# Patient Record
Sex: MALE | Race: WHITE | Employment: FULL TIME | URBAN - METROPOLITAN AREA
[De-identification: names, ages, dates, MRNs, and addresses within clinical notes are randomized per-mention and may not be internally consistent; named-entity substitution may affect disease eponyms.]

---

## 2022-07-13 ENCOUNTER — APPOINTMENT (OUTPATIENT)
Dept: GENERAL RADIOLOGY | Age: 32
End: 2022-07-13
Payer: COMMERCIAL

## 2022-07-13 ENCOUNTER — HOSPITAL ENCOUNTER (EMERGENCY)
Age: 32
Discharge: HOME | End: 2022-07-13
Attending: EMERGENCY MEDICINE
Payer: COMMERCIAL

## 2022-07-13 VITALS
SYSTOLIC BLOOD PRESSURE: 142 MMHG | OXYGEN SATURATION: 97 % | BODY MASS INDEX: 27.83 KG/M2 | DIASTOLIC BLOOD PRESSURE: 87 MMHG | RESPIRATION RATE: 18 BRPM | TEMPERATURE: 98.4 F | HEIGHT: 73 IN | WEIGHT: 210 LBS | HEART RATE: 86 BPM

## 2022-07-13 DIAGNOSIS — S61.230A INFECTED PUNCTURE WOUND OF RIGHT INDEX FINGER, INITIAL ENCOUNTER: ICD-10-CM

## 2022-07-13 DIAGNOSIS — L08.9 INFECTED PUNCTURE WOUND OF RIGHT INDEX FINGER, INITIAL ENCOUNTER: ICD-10-CM

## 2022-07-13 DIAGNOSIS — S61.239A PUNCTURE WOUND OF FINGER, INITIAL ENCOUNTER: Primary | ICD-10-CM

## 2022-07-13 PROCEDURE — 73140 X-RAY EXAM OF FINGER(S): CPT

## 2022-07-13 PROCEDURE — 90715 TDAP VACCINE 7 YRS/> IM: CPT | Performed by: PHYSICIAN ASSISTANT

## 2022-07-13 PROCEDURE — 99284 EMERGENCY DEPT VISIT MOD MDM: CPT

## 2022-07-13 PROCEDURE — 6360000002 HC RX W HCPCS: Performed by: PHYSICIAN ASSISTANT

## 2022-07-13 PROCEDURE — 90471 IMMUNIZATION ADMIN: CPT | Performed by: PHYSICIAN ASSISTANT

## 2022-07-13 PROCEDURE — 29130 APPL FINGER SPLINT STATIC: CPT

## 2022-07-13 PROCEDURE — 6370000000 HC RX 637 (ALT 250 FOR IP): Performed by: PHYSICIAN ASSISTANT

## 2022-07-13 RX ORDER — GINSENG 100 MG
CAPSULE ORAL ONCE
Status: COMPLETED | OUTPATIENT
Start: 2022-07-13 | End: 2022-07-13

## 2022-07-13 RX ORDER — CLINDAMYCIN HYDROCHLORIDE 150 MG/1
450 CAPSULE ORAL ONCE
Status: COMPLETED | OUTPATIENT
Start: 2022-07-13 | End: 2022-07-13

## 2022-07-13 RX ORDER — CLINDAMYCIN HYDROCHLORIDE 150 MG/1
450 CAPSULE ORAL 3 TIMES DAILY
Qty: 63 CAPSULE | Refills: 0 | Status: SHIPPED | OUTPATIENT
Start: 2022-07-13 | End: 2022-07-20

## 2022-07-13 RX ADMIN — BACITRACIN: 500 OINTMENT TOPICAL at 17:40

## 2022-07-13 RX ADMIN — CLINDAMYCIN HYDROCHLORIDE 450 MG: 150 CAPSULE ORAL at 17:17

## 2022-07-13 RX ADMIN — BACITRACIN: 500 OINTMENT TOPICAL at 18:24

## 2022-07-13 RX ADMIN — TETANUS TOXOID, REDUCED DIPHTHERIA TOXOID AND ACELLULAR PERTUSSIS VACCINE, ADSORBED 0.5 ML: 5; 2.5; 8; 8; 2.5 SUSPENSION INTRAMUSCULAR at 17:04

## 2022-07-13 ASSESSMENT — PAIN - FUNCTIONAL ASSESSMENT: PAIN_FUNCTIONAL_ASSESSMENT: 0-10

## 2022-07-13 ASSESSMENT — PAIN DESCRIPTION - LOCATION: LOCATION: FINGER (COMMENT WHICH ONE)

## 2022-07-13 ASSESSMENT — PAIN SCALES - GENERAL: PAINLEVEL_OUTOF10: 10

## 2022-07-13 ASSESSMENT — PAIN DESCRIPTION - ORIENTATION: ORIENTATION: RIGHT

## 2022-07-13 ASSESSMENT — PAIN DESCRIPTION - PAIN TYPE: TYPE: ACUTE PAIN

## 2022-07-13 NOTE — ED PROVIDER NOTES
[address] [facility] ED  [address] [facility] [address]  Phone: [phone]  Fax: [phone]      eMERGENCY dEPARTMENT eNCOUnter      Pt Name: Chio Robbins  MRN: [id number]  [place] 1990  Date of evaluation: 7/13/22      CHIEF COMPLAINT:  Chief Complaint   Patient presents with    Hand Injury       HISTORY OF PRESENT ILLNESS    Chio Robbins is a 34 y.o. male who presents with evaluation for orthopedic pain:    Location/Symptom:  Right short finger FB  Timing/Onset:  JPTA  Context/Setting:    Pt here with retained large nail in his right short finger. 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 No paresthesias/focal weakness. Tetanus not UTD. Quality:   Achy, sharp  Duration:   constant  Modifying Factors: Worse with movement and weightbearing, better with rest  Severity:   Mild-moderate    Nursing Notes were reviewed. REVIEW OF SYSTEMS       Constitutional: Denies recent fever, chills. Eyes: No vision changes. Neck: No neck pain. Respiratory: Denies recent shortness of breath. Cardiac:  Denies recent chest pain. GI:  Denies abdominal pain/nausea/vomiting/diarrhea. : Denies dysuria. Musculoskeletal:   Per HPI  Neurologic:  No headache. No focal weakness. No paresthesias. Skin:  Denies any rash. Negative in 10 essential Systems except as mentioned above and in the HPI. PAST MEDICAL HISTORY   PMH:  has a past medical history of Collar bone fracture. Surgical History:  has a past surgical history that includes fracture surgery and Tonsillectomy. Social History:  reports that he has been smoking. He does not have any smokeless tobacco history on file. He reports current alcohol use. He reports that he does not use drugs. Family History: None  Psychiatric History: None    Allergies:is allergic to pcn [penicillins].       PHYSICAL EXAM     INITIAL VITALS: BP (!) 142/87   Pulse 86   Temp 98.4 °F (36.9 °C) (Oral)   Resp 18   Ht 6' 1\" (1.854 m)   Wt 95.3 kg (210 lb)   SpO2 97%   BMI 27.71 kg/m²     Constitutional:  Well developed   Eyes:  Pupils equal/round  HENT:  Atraumatic, external ears normal, nose normal  Respiratory:  Comfortable speech and breathing  Musculoskeletal:  Right short finger with retained large nail with end of nail protruding from lateral aspect of distal mid-phalanx/DIP joint area. No nail involvement. No significant edema. No ischemic changes. Remainder of short finger and right hand all wnl. NV intact distally. No signs of acute limb ischemia. Integument:   No rash. Neurologic:  Alert & appropriate mentation/interaction, no focal deficits noted     DIAGNOSTIC RESULTS     EKG: All EKG's are interpreted by the Emergency Department Physician who either signs or Co-signs this chart in the absence of a cardiologist.  Not indicated    RADIOLOGY:   Reviewed the radiologist:  XR FINGER RIGHT (MIN 2 VIEWS)   Final Result      XR FINGER RIGHT (MIN 2 VIEWS)   Final Result   Metallic nail/foreign body in the 5th digit adjacent to the DIP joint. LABS:  Labs Reviewed - No data to display  Not indicated    EMERGENCY DEPARTMENT COURSE / MDM:     1  XR/digital block/Boostrix and oral abx to be ordered. Retained nail of right short finger distally. No ischemic changes. 1740  Procedure note:   Using Betadine dine wash I completed a full digital block using 3 mL of Marcaine with epinephrine 0.5%. Also providing some additional anesthesia locally at the site of the penetration of the nail through the dorsal aspect of the right finger. There is no nail involvement. With single attempt was able to left finger off of the nail using traction/countertraction. Wound was cleaned and irrigated with saline. Patient had full active extension and flexion. ain management satisfactory. No significant active bleeding. 1818  Reviewed XR with attending, no acute fracture or DIP involvement.   Doxy for home as PCN allergy as a child he confirms. Unable to confirm level of allergy. Splint provided and directed [facility] per his work protocol. Also provided Hand Surgeon f/u to use as needed. I have reviewed the disposition diagnosis with the patient and or their family/guardian. I have answered their questions and given discharge instructions. They voiced understanding of these instructions and did not have any further questions or complaints. Orders Placed This Encounter   Medications    bacitracin ointment    Tetanus-Diphth-Acell Pertussis (BOOSTRIX) injection 0.5 mL    clindamycin (CLEOCIN) capsule 450 mg     Order Specific Question:   Antimicrobial Indications     Answer: Other     Order Specific Question:   Other Abx Indication     Answer:   finger FB    bacitracin ointment    clindamycin (CLEOCIN) 150 MG capsule     Sig: Take 3 capsules by mouth 3 times daily for 7 days     Dispense:  63 capsule     Refill:  0       CONSULTS:  None      FINAL IMPRESSION      1. Puncture wound of finger, initial encounter    2. Infected puncture wound of right index finger, initial encounter          DISPOSITION/PLAN:  DISPOSITION          PATIENT REFERRED TO:  Nancy Amezcua MD  [address] [address]  [phone]    Schedule an appointment as soon as possible for a visit in 2 days  for re-evaluation of your injury    [facility] ED  [address] [place] [address]  [phone]  Go to   for worsening of symptoms      DISCHARGE MEDICATIONS:  Discharge Medication List as of 2022  6:27 PM      START taking these medications    Details   clindamycin (CLEOCIN) 150 MG capsule Take 3 capsules by mouth 3 times daily for 7 days, Disp-63 capsule, R-0Normal             (Please note that portions of this note were completed with a voice recognition program.  Efforts were made to edit the dictations but occasionally words are mis-transcribed.)    PATRICK Lott PA-C  07/13/22 2023

## 2022-07-13 NOTE — ED PROVIDER NOTES
[address] [facility] ED    [address] [address] [facility] [address]  Phone: [phone]  Fax: [phone]  Emergency Department  Faculty Attestation    I performed a history and physical examination of the patient and discussed management with the mid level provideer. I reviewed the mid level provider's note and agree with the documented findings and plan of care. Any areas of disagreement are noted on the chart. I was personally present for the key portions of any procedures. I have documented in the chart those procedures where I was not present during the key portions. I have reviewed the emergency nurses triage note. I agree with the chief complaint, past medical history, past surgical history, allergies, medications, social and family history as documented unless otherwise noted below. Documentation of the HPI, Physical Exam and Medical Decision Making performed by medical students or scribes is based on my personal performance of the HPI, PE and MDM. For Physician Assistant/ Nurse Practitioner cases/documentation I have personally evaluated this patient and have completed at least one if not all key elements of the E/M (history, physical exam, and MDM). Additional findings are as noted.       Primary Care Physician:  Johnson Lee MD    CHIEF COMPLAINT       Chief Complaint   Patient presents with    Hand Injury       RECENT VITALS:   Temp: 98.4 °F (36.9 °C),  Heart Rate: 86, Resp: 18, BP: (!) 142/87    LABS:  Labs Reviewed - No data to display    XR FINGER RIGHT (MIN 2 VIEWS) (Final result)  Result time 07/13/22 18:14:15  Final result by Christine Ruth MD (07/13/22 18:14:15)                Narrative:    EXAMINATION:   THREE XRAY VIEWS OF THE RIGHT FINGERS     7/13/2022 2:40 pm     COMPARISON:   Earlier exam of same date     HISTORY:   ORDERING SYSTEM PROVIDED HISTORY: post-removal of nail   TECHNOLOGIST PROVIDED HISTORY:   post-removal of nail   Reason for Exam: post nail removal FINDINGS:IMPRESSION:   Previous radiopaque foreign body/nail in the distal 5th digit has been   removed. No acute bony abnormalities. No fractures seen. Joint spaces are   intact                         XR FINGER RIGHT (MIN 2 VIEWS) (Final result)  Result time 07/13/22 17:50:27  Final result by Isabelle Call MD (07/13/22 17:50:27)                Impression:    Metallic nail/foreign body in the 5th digit adjacent to the DIP joint. Narrative:    EXAMINATION:   THREE XRAY VIEWS OF THE RIGHT FINGERS     7/13/2022 5:04 pm     COMPARISON:   None. HISTORY:   ORDERING SYSTEM PROVIDED HISTORY: 5th finger, retained nail mid phalanx/DIP   TECHNOLOGIST PROVIDED HISTORY:   5th finger, retained nail mid phalanx/DIP   Reason for Exam: nail through finger     FINDINGS:   Images are apparently obtained through glove with adjacent overlying material.     There is a nail in the 5th digit which appears to be on the ulnar aspect   adjacent to the DIP joint. No definite fractures are seen on the images   obtained. Suggest additional views after removal of the foreign body. Bone   mineralization and alignment appear otherwise intact as visualized. PERTINENT ATTENDING PHYSICIAN COMMENTS:    The patient presents with an injury to his right small finger. He was nailing some wood at work and he used a nail gun. He nailed into his small finger. He is not sure of his tetanus status. He has no other injury. On exam, the patient has penetration of the distal phalanx of the right small finger by a nail that is into a board as well. It goes from the volar surface through to the dorsal surface. He has intact capillary refill but cannot move due to the pain and the fact that his finger is attached to a piece of wood. The patient received a digital block and the area was cleansed. The PA was able to remove the foreign body. Please refer to his documentation. X-ray to follow-up shows no fracture. We will place the patient on antibiotics and refer him to the hand specialist who is a plastic surgeon. The patient is discharged in good condition.          Олег Roberts MD  07/15/22 5770

## 2022-12-10 ENCOUNTER — APPOINTMENT (OUTPATIENT)
Dept: GENERAL RADIOLOGY | Age: 32
End: 2022-12-10
Payer: COMMERCIAL

## 2022-12-10 ENCOUNTER — HOSPITAL ENCOUNTER (EMERGENCY)
Age: 32
Discharge: HOME | End: 2022-12-10
Attending: EMERGENCY MEDICINE
Payer: COMMERCIAL

## 2022-12-10 VITALS
SYSTOLIC BLOOD PRESSURE: 121 MMHG | HEIGHT: 73 IN | DIASTOLIC BLOOD PRESSURE: 76 MMHG | RESPIRATION RATE: 12 BRPM | HEART RATE: 87 BPM | OXYGEN SATURATION: 96 % | WEIGHT: 200 LBS | TEMPERATURE: 98.2 F | BODY MASS INDEX: 26.51 KG/M2

## 2022-12-10 DIAGNOSIS — M25.532 LEFT WRIST PAIN: Primary | ICD-10-CM

## 2022-12-10 PROCEDURE — 73110 X-RAY EXAM OF WRIST: CPT

## 2022-12-10 PROCEDURE — 99283 EMERGENCY DEPT VISIT LOW MDM: CPT

## 2022-12-10 PROCEDURE — 6370000000 HC RX 637 (ALT 250 FOR IP): Performed by: EMERGENCY MEDICINE

## 2022-12-10 PROCEDURE — 6360000002 HC RX W HCPCS: Performed by: EMERGENCY MEDICINE

## 2022-12-10 PROCEDURE — 73130 X-RAY EXAM OF HAND: CPT

## 2022-12-10 RX ORDER — DEXAMETHASONE SODIUM PHOSPHATE 10 MG/ML
8 INJECTION, SOLUTION INTRAMUSCULAR; INTRAVENOUS ONCE
Status: COMPLETED | OUTPATIENT
Start: 2022-12-10 | End: 2022-12-10

## 2022-12-10 RX ORDER — IBUPROFEN 800 MG/1
800 TABLET ORAL ONCE
Status: COMPLETED | OUTPATIENT
Start: 2022-12-10 | End: 2022-12-10

## 2022-12-10 RX ORDER — IBUPROFEN 600 MG/1
600 TABLET ORAL EVERY 6 HOURS PRN
Qty: 120 TABLET | Refills: 0 | Status: SHIPPED | OUTPATIENT
Start: 2022-12-10

## 2022-12-10 RX ORDER — HYDROCODONE BITARTRATE AND ACETAMINOPHEN 5; 325 MG/1; MG/1
1 TABLET ORAL EVERY 4 HOURS PRN
Qty: 8 TABLET | Refills: 0 | Status: SHIPPED | OUTPATIENT
Start: 2022-12-10 | End: 2022-12-13

## 2022-12-10 RX ADMIN — IBUPROFEN 800 MG: 800 TABLET, FILM COATED ORAL at 20:11

## 2022-12-10 RX ADMIN — DEXAMETHASONE SODIUM PHOSPHATE 8 MG: 10 INJECTION INTRAMUSCULAR; INTRAVENOUS at 20:11

## 2022-12-10 ASSESSMENT — ENCOUNTER SYMPTOMS
ABDOMINAL DISTENTION: 0
CHEST TIGHTNESS: 0
APNEA: 0
COLOR CHANGE: 0
EYES NEGATIVE: 1
SINUS PAIN: 0
SHORTNESS OF BREATH: 0
CONSTIPATION: 0
VOMITING: 0
DIARRHEA: 0

## 2022-12-10 ASSESSMENT — PAIN DESCRIPTION - FREQUENCY: FREQUENCY: CONTINUOUS

## 2022-12-10 ASSESSMENT — PAIN DESCRIPTION - LOCATION: LOCATION: WRIST

## 2022-12-10 ASSESSMENT — PAIN DESCRIPTION - PAIN TYPE: TYPE: ACUTE PAIN

## 2022-12-10 ASSESSMENT — PAIN DESCRIPTION - ORIENTATION: ORIENTATION: LEFT

## 2022-12-10 ASSESSMENT — PAIN SCALES - GENERAL
PAINLEVEL_OUTOF10: 10
PAINLEVEL_OUTOF10: 10

## 2022-12-10 ASSESSMENT — PAIN - FUNCTIONAL ASSESSMENT: PAIN_FUNCTIONAL_ASSESSMENT: 0-10

## 2022-12-10 NOTE — Clinical Note
Naomi Contreras was seen and treated in our emergency department on 12/10/2022. He may return to work on 12/12/2022. Light duty through 12/16/22     If you have any questions or concerns, please don't hesitate to call.       Gabriella Martinez MD

## 2022-12-11 NOTE — DISCHARGE INSTRUCTIONS
I recommend rest ice compression and elevation. Anti-inflammatories are recommended to take as instructed. Norco can be used for more severe pain. Eriberto Racer is a narcotic can be addictive and should not be used within 4 hours of driving or operating machinery.     [facility]: [phone]

## 2022-12-11 NOTE — ED NOTES
Wrist brace applied to left wrist. Pt denies any questions at this time.       Jackie Burnette RN  12/10/22 3614

## 2022-12-11 NOTE — ED PROVIDER NOTES
EMERGENCY DEPARTMENT ENCOUNTER    Pt Name: Luigi Mcarthur  MRN: [id number]  [place] 1990  Date of evaluation: 12/10/22  CHIEF COMPLAINT       Chief Complaint   Patient presents with    Wrist Pain     Left side, awoke with pain and limited ROM, no accident, injury or trauma that patient is aware of. HISTORY OF PRESENT ILLNESS   42-year-old male presents emergency room for left wrist pain. He reports no injuries that he is aware of. No procedures of the wrist.  Pain has been going on for 1 day. Patient does work with his hands with repetitive activity. He reports Friday when he got off work he did not have pain. He woke up this morning with it and it has continued to bother him throughout the day. REVIEW OF SYSTEMS     Review of Systems   Constitutional:  Negative for activity change, chills and diaphoresis. HENT:  Negative for congestion, sinus pain and tinnitus. Eyes: Negative. Respiratory:  Negative for apnea, chest tightness and shortness of breath. Gastrointestinal:  Negative for abdominal distention, constipation, diarrhea and vomiting. Genitourinary:  Negative for difficulty urinating and frequency. Musculoskeletal:  Negative for arthralgias and myalgias. Skin:  Negative for color change and rash. Neurological:  Negative for dizziness. Hematological: Negative. Psychiatric/Behavioral: Negative. PASTMEDICAL HISTORY     Past Medical History:   Diagnosis Date    Collar bone fracture      Past Problem List  There is no problem list on file for this patient. SURGICAL HISTORY       Past Surgical History:   Procedure Laterality Date    FRACTURE SURGERY      TONSILLECTOMY       CURRENT MEDICATIONS       Discharge Medication List as of 12/10/2022  8:56 PM        CONTINUE these medications which have NOT CHANGED    Details   sulfamethoxazole-trimethoprim (BACTRIM DS) 800-160 MG per tablet Take 1 tablet by mouth 2 times daily. , Disp-14 tablet, R-0           ALLERGIES is allergic to pcn [penicillins]. FAMILY HISTORY     has no family status information on file. SOCIAL HISTORY       Social History     Tobacco Use    Smoking status: Every Day   Substance Use Topics    Alcohol use: Yes     Comment: rare    Drug use: No     PHYSICAL EXAM     INITIAL VITALS: /76   Pulse 87   Temp 98.2 °F (36.8 °C)   Resp 12   Ht 6' 1\" (1.854 m)   Wt 200 lb (90.7 kg)   SpO2 96%   BMI 26.39 kg/m²    Physical Exam  Constitutional:       General: He is not in acute distress. Appearance: He is well-developed. HENT:      Head: Normocephalic. Eyes:      Pupils: Pupils are equal, round, and reactive to light. Cardiovascular:      Rate and Rhythm: Normal rate and regular rhythm. Heart sounds: Normal heart sounds. Pulmonary:      Effort: Pulmonary effort is normal. No respiratory distress. Musculoskeletal:      Left wrist: Tenderness present. No crepitus. Decreased range of motion. Comments: Minimal soft tissue swelling to the left wrist no significant erythema or warmth very limited range of motion due to pain   Skin:     General: Skin is warm and dry. Neurological:      Mental Status: He is alert and oriented to person, place, and time. MEDICAL DECISION MAKING:       Nondistressed 28-year-old male presented to the emergency room for left wrist pain. Pain is fairly diffuse. There is no clinical suspicion for infectious etiology. No trauma. Symptoms considered to be related to overuse at this time. Patient encouraged on rest ice compression and elevation. Patient courage to follow-up with the PCP same-day appointment.     CRITICAL CARE:       PROCEDURES:    Procedures    DIAGNOSTIC RESULTS   EKG:All EKG's are interpreted by the Emergency Department Physician who either signs or Co-signs this chart in the absence of a cardiologist.        RADIOLOGY:All plain film, CT, MRI, and formal ultrasound images (except ED bedside ultrasound) are read by the radiologist, see reports below, unless otherwisenoted in MDM or here. XR HAND LEFT (MIN 3 VIEWS)   Final Result   No acute osseous abnormality. No fracture. XR WRIST LEFT (MIN 3 VIEWS)   Final Result   No acute osseous abnormality. No fracture. LABS: All lab results were reviewed by myself, and all abnormals are listed below. Labs Reviewed - No data to display    EMERGENCY DEPARTMENTCOURSE:         Vitals:    Vitals:    12/10/22 1745   BP: 121/76   Pulse: 87   Resp: 12   Temp: 98.2 °F (36.8 °C)   SpO2: 96%   Weight: 200 lb (90.7 kg)   Height: 6' 1\" (1.854 m)       The patient was given the following medications while in the emergency department:  Orders Placed This Encounter   Medications    ibuprofen (ADVIL;MOTRIN) tablet 800 mg    dexamethasone (PF) (DECADRON) injection 8 mg    ibuprofen (IBU) 600 MG tablet     Sig: Take 1 tablet by mouth every 6 hours as needed for Pain     Dispense:  120 tablet     Refill:  0    HYDROcodone-acetaminophen (NORCO) 5-325 MG per tablet     Sig: Take 1 tablet by mouth every 4 hours as needed for Pain for up to 3 days. Intended supply: 3 days. Take lowest dose possible to manage pain     Dispense:  8 tablet     Refill:  0     CONSULTS:  None    FINAL IMPRESSION      1. Left wrist pain          DISPOSITION/PLAN   DISPOSITION Decision To Discharge 12/10/2022 08:42:18 PM      PATIENT REFERRED TO:  No follow-up provider specified. DISCHARGE MEDICATIONS:  Discharge Medication List as of 12/10/2022  8:56 PM        START taking these medications    Details   HYDROcodone-acetaminophen (NORCO) 5-325 MG per tablet Take 1 tablet by mouth every 4 hours as needed for Pain for up to 3 days. Intended supply: 3 days. Take lowest dose possible to manage pain, Disp-8 tablet, R-0Normal           Jenn Cardozo MD  Attending Emergency Physician      Care during this encounter was due to an unprecedented national emergency due to COVID-19.              Marquez Coppola, MD  12/10/22 3748

## 2023-08-20 ENCOUNTER — HOSPITAL ENCOUNTER (EMERGENCY)
Age: 33
Discharge: HOME | End: 2023-08-20

## 2023-08-20 ENCOUNTER — APPOINTMENT (OUTPATIENT)
Dept: GENERAL RADIOLOGY | Age: 33
End: 2023-08-20

## 2023-08-20 VITALS
RESPIRATION RATE: 16 BRPM | BODY MASS INDEX: 29.16 KG/M2 | HEART RATE: 86 BPM | SYSTOLIC BLOOD PRESSURE: 133 MMHG | DIASTOLIC BLOOD PRESSURE: 67 MMHG | WEIGHT: 220 LBS | OXYGEN SATURATION: 96 % | TEMPERATURE: 98.1 F | HEIGHT: 73 IN

## 2023-08-20 DIAGNOSIS — M25.562 ACUTE PAIN OF LEFT KNEE: Primary | ICD-10-CM

## 2023-08-20 PROCEDURE — 73562 X-RAY EXAM OF KNEE 3: CPT

## 2023-08-20 PROCEDURE — 99283 EMERGENCY DEPT VISIT LOW MDM: CPT

## 2023-08-20 RX ORDER — IBUPROFEN 800 MG/1
800 TABLET ORAL EVERY 8 HOURS PRN
Qty: 30 TABLET | Refills: 0 | Status: SHIPPED | OUTPATIENT
Start: 2023-08-20

## 2023-08-20 RX ORDER — NAPROXEN SODIUM 220 MG
220 TABLET ORAL 2 TIMES DAILY WITH MEALS
COMMUNITY

## 2023-08-20 RX ORDER — KETOROLAC TROMETHAMINE 30 MG/ML
30 INJECTION, SOLUTION INTRAMUSCULAR; INTRAVENOUS ONCE
Status: DISCONTINUED | OUTPATIENT
Start: 2023-08-20 | End: 2023-08-21 | Stop reason: HOSPADM

## 2023-08-20 ASSESSMENT — PAIN SCALES - GENERAL: PAINLEVEL_OUTOF10: 8

## 2023-08-20 ASSESSMENT — PAIN - FUNCTIONAL ASSESSMENT: PAIN_FUNCTIONAL_ASSESSMENT: 0-10

## 2023-08-21 ENCOUNTER — OFFICE VISIT (OUTPATIENT)
Dept: ORTHOPEDIC SURGERY | Age: 33
End: 2023-08-21

## 2023-08-21 VITALS — RESPIRATION RATE: 16 BRPM | WEIGHT: 220 LBS | BODY MASS INDEX: 29.16 KG/M2 | OXYGEN SATURATION: 100 % | HEIGHT: 73 IN

## 2023-08-21 DIAGNOSIS — M23.92 INTERNAL DERANGEMENT OF LEFT KNEE: ICD-10-CM

## 2023-08-21 DIAGNOSIS — M25.562 ACUTE PAIN OF LEFT KNEE: ICD-10-CM

## 2023-08-21 DIAGNOSIS — S83.8X2A INJURY OF MENISCUS OF LEFT KNEE, INITIAL ENCOUNTER: Primary | ICD-10-CM

## 2023-08-21 PROCEDURE — 99204 OFFICE O/P NEW MOD 45 MIN: CPT | Performed by: ORTHOPAEDIC SURGERY

## 2023-08-21 NOTE — DISCHARGE INSTRUCTIONS
Take medication as prescribed, use ice 20 minutes at a time 4-6 times a day Wear splint and use crutches until cleared by orthopedics.   Go to your orthopedic appointment tomorrow at 1

## 2023-08-21 NOTE — PROGRESS NOTES
order to know exactly how to proceed with treatment, an MRI was ordered today to evaluate for internal derangement/meniscus. This is medically necessary to evaluate the structures in this area, for both diagnosis and treatment.     -The patient was provided a note for work, allowing him to remain off work for 2 weeks      All questions were answered and the above plan was agreed upon. The patient will return to clinic after the MRI has been obtained without x-rays. At his next visit, we will review his MRI, and possibly consider multiple treatment options versus physical therapy plus an injection. At the patient's next visit, depending on how the patient is doing and/or new imaging/labs results, we may consider the following options:    []  Orthotic (OTC)     []  Orthotic (custom)          []  Rocker bottom shoes     []  Brace (OTC)        []  Brace (custom)             []  CAM boot        []  Night splint         []  Heel cups        []  Strap      []  Toe sleeves/splints    []  PT:                     []  Wean out of immobilization   []  Advance activity       []  Topical               []  NSAIDs          []  Ignacia         []  Referral:         []  Stress xrays       []  CT         []  MRI        []  Injection:         []  Consider OR      []  Pick OR date    No follow-ups on file. No orders of the defined types were placed in this encounter. No orders of the defined types were placed in this encounter. Amena Oreilly MD  Orthopedic Surgery        Please excuse any typos/errors, as this note was created with the assistance of voice recognition software. While intending to generate a document that actually reflects the content of the visit, the document can still have some errors including those of syntax and sound-a-like substitutions which may escape proof reading. In such instances, actual meaning can be extrapolated by context.

## 2023-08-23 ASSESSMENT — ENCOUNTER SYMPTOMS
ABDOMINAL PAIN: 0
BACK PAIN: 0
NAUSEA: 0
COLOR CHANGE: 0
CONSTIPATION: 0
VOMITING: 0
DIARRHEA: 0
COUGH: 0
SHORTNESS OF BREATH: 0

## 2023-09-01 ENCOUNTER — TELEPHONE (OUTPATIENT)
Dept: ORTHOPEDIC SURGERY | Age: 33
End: 2023-09-01

## 2023-09-01 NOTE — TELEPHONE ENCOUNTER
Patient is requesting his work note be revised to return to work after his follow up appointment with Dr Cachorro Godfrey on 9/19. He would like to pick it up in the office once completed. Please advise. Thank you.

## 2023-09-05 ENCOUNTER — TELEPHONE (OUTPATIENT)
Dept: ORTHOPEDIC SURGERY | Age: 33
End: 2023-09-05

## 2023-09-05 NOTE — TELEPHONE ENCOUNTER
Patient called requesting work Note covering him through follow up on  9/19 to be faxed to employer at [phone] Thanks for coming today.  [facility]  [address] [place] [address]    To schedule future appointments in Ortho Clinic, you may call [phone].    To schedule ordered imaging by your Provider: Call [facility] at [phone]    [employer] available online at:   [email or web address]    Please call if any further questions or concerns [phone] and ask for the Orthopedic Department. Clinic hours 8 am to 5 pm.    Return to clinic if symptoms worsen.

## 2023-09-05 NOTE — TELEPHONE ENCOUNTER
Could one of you contact the patient and inform him that his letter is ready for ? Dr. Wil Lomeli the extension he asked for. Please, and thank you.

## 2023-09-05 NOTE — TELEPHONE ENCOUNTER
Dr. Merary Craig,     Patient would like to remain off of work until he follows up with you about his MRI. He was seen on 8/21 (with a note for 2 weeks off) his MRI is scheduled 9/9 and his follow up isn't until 9/19, are you OK with extending his note? Please advise. Thank you.

## 2023-09-09 ENCOUNTER — HOSPITAL ENCOUNTER (OUTPATIENT)
Dept: MRI IMAGING | Age: 33
End: 2023-09-09
Attending: ORTHOPAEDIC SURGERY

## 2023-09-09 DIAGNOSIS — M23.92 INTERNAL DERANGEMENT OF LEFT KNEE: ICD-10-CM

## 2023-09-09 DIAGNOSIS — S83.8X2A INJURY OF MENISCUS OF LEFT KNEE, INITIAL ENCOUNTER: ICD-10-CM

## 2023-09-09 PROCEDURE — 73721 MRI JNT OF LWR EXTRE W/O DYE: CPT

## 2023-09-19 ENCOUNTER — OFFICE VISIT (OUTPATIENT)
Dept: ORTHOPEDIC SURGERY | Age: 33
End: 2023-09-19

## 2023-09-19 VITALS — BODY MASS INDEX: 29.16 KG/M2 | WEIGHT: 220 LBS | RESPIRATION RATE: 13 BRPM | HEIGHT: 73 IN

## 2023-09-19 DIAGNOSIS — M94.262 CHONDROMALACIA OF LEFT KNEE: Primary | ICD-10-CM

## 2023-09-19 DIAGNOSIS — M25.562 ACUTE PAIN OF LEFT KNEE: Primary | ICD-10-CM

## 2023-09-19 DIAGNOSIS — S80.02XD CONTUSION OF LEFT KNEE, SUBSEQUENT ENCOUNTER: ICD-10-CM

## 2023-09-19 DIAGNOSIS — M94.262 CHONDROMALACIA OF LEFT KNEE: ICD-10-CM

## 2023-09-19 RX ORDER — NAPROXEN 500 MG/1
500 TABLET ORAL 2 TIMES DAILY PRN
Qty: 60 TABLET | Refills: 0 | Status: SHIPPED | OUTPATIENT
Start: 2023-09-19

## 2023-09-19 RX ORDER — LIDOCAINE HYDROCHLORIDE 10 MG/ML
4 INJECTION, SOLUTION INFILTRATION; PERINEURAL ONCE
Status: COMPLETED | OUTPATIENT
Start: 2023-09-19 | End: 2023-09-19

## 2023-09-19 RX ORDER — TRIAMCINOLONE ACETONIDE 40 MG/ML
40 INJECTION, SUSPENSION INTRA-ARTICULAR; INTRAMUSCULAR ONCE
Status: COMPLETED | OUTPATIENT
Start: 2023-09-19 | End: 2023-09-19

## 2023-09-19 RX ADMIN — LIDOCAINE HYDROCHLORIDE 4 ML: 10 INJECTION, SOLUTION INFILTRATION; PERINEURAL at 13:35

## 2023-09-19 RX ADMIN — TRIAMCINOLONE ACETONIDE 40 MG: 40 INJECTION, SUSPENSION INTRA-ARTICULAR; INTRAMUSCULAR at 13:36

## 2023-09-19 NOTE — TELEPHONE ENCOUNTER
Dr. Felicia Arias patient seen today to review MRI results L knee. Patient needs letter for work with what restrictions or if he is ok to work. Call back [phone].

## 2023-09-19 NOTE — PROGRESS NOTES
[facility]  [address] [facility] Andrea Franco  [address] [place] [address]  Dept: [phone]    Ambulatory Orthopedic Consult      CHIEF COMPLAINT:    Chief Complaint   Patient presents with    Knee Pain     Left       HISTORY OF PRESENT ILLNESS:      The patient is a 28 y.o. male who is being seen for evaluation of the above, which began 8/17/2023 secondary to a fall out of bed (reports a twisting mechanism, as his foot was asleep when he went to stand up)  . At today's visit, he is using a knee brace and crutches. History is obtained today from:   [x]  the patient     [x]  EMR     []  one family member/friend    []  multiple family members/friends    []  other: At today's visit, the patient localizes pain to the left knee diffusely. He reports decreased range of motion, and severe sharp pain with sudden movements. INTERVAL HISTORY 9/19/2023:  He is seen again today in the office for follow up of imaging as below. Since being seen last, the patient is doing about the same overall. At today's visit, he is using a brace. History is obtained today from:   [x]  the patient     [x]  EMR     []  one family member/friend    []  multiple family members/friends    []  other:        REVIEW OF SYSTEMS:  Musculoskeletal: See HPI for pertinent positives     Past Medical History:    He  has a past medical history of Collar bone fracture. Past Surgical History:    He  has a past surgical history that includes fracture surgery and Tonsillectomy.      Current Medications:     Current Outpatient Medications:     naproxen sodium (ALEVE) 220 MG tablet, Take 1 tablet by mouth 2 times daily (with meals), Disp: , Rfl:     ibuprofen (ADVIL;MOTRIN) 800 MG tablet, Take 1 tablet by mouth every 8 hours as needed for Pain, Disp: 30 tablet, Rfl: 0     Allergies:    Pcn [penicillins]    Family History:  family history is not on